# Patient Record
Sex: MALE | Race: WHITE | Employment: OTHER | URBAN - METROPOLITAN AREA
[De-identification: names, ages, dates, MRNs, and addresses within clinical notes are randomized per-mention and may not be internally consistent; named-entity substitution may affect disease eponyms.]

---

## 2017-09-21 ENCOUNTER — HOSPITAL ENCOUNTER (EMERGENCY)
Age: 72
Discharge: HOME | End: 2017-09-21
Attending: EMERGENCY MEDICINE | Admitting: EMERGENCY MEDICINE
Payer: MEDICARE

## 2017-09-21 VITALS
WEIGHT: 259 LBS | TEMPERATURE: 98.1 F | RESPIRATION RATE: 18 BRPM | DIASTOLIC BLOOD PRESSURE: 64 MMHG | HEART RATE: 56 BPM | OXYGEN SATURATION: 94 % | SYSTOLIC BLOOD PRESSURE: 117 MMHG | BODY MASS INDEX: 39.25 KG/M2 | HEIGHT: 68 IN

## 2017-09-21 DIAGNOSIS — T40.601A OPIATE OVERDOSE, ACCIDENTAL OR UNINTENTIONAL, INITIAL ENCOUNTER (HCC): Primary | ICD-10-CM

## 2017-09-21 PROCEDURE — 99282 EMERGENCY DEPT VISIT SF MDM: CPT

## 2017-09-21 RX ORDER — OXYCODONE HCL 20 MG/1
20 TABLET, FILM COATED, EXTENDED RELEASE ORAL EVERY 12 HOURS
COMMUNITY

## 2017-09-21 RX ORDER — CLOPIDOGREL BISULFATE 75 MG/1
75 TABLET ORAL
COMMUNITY

## 2017-09-21 RX ORDER — MEMANTINE HYDROCHLORIDE 10 MG/1
TABLET ORAL DAILY
COMMUNITY

## 2017-09-21 RX ORDER — SALSALATE 750 MG
750 TABLET ORAL 2 TIMES DAILY
COMMUNITY

## 2017-09-21 RX ORDER — ERGOCALCIFEROL 1.25 MG/1
50000 CAPSULE ORAL
COMMUNITY

## 2017-09-21 RX ORDER — PANTOPRAZOLE SODIUM 40 MG/1
40 TABLET, DELAYED RELEASE ORAL DAILY
COMMUNITY

## 2017-09-21 RX ORDER — AMLODIPINE BESYLATE AND BENAZEPRIL HYDROCHLORIDE 10; 40 MG/1; MG/1
1 CAPSULE ORAL DAILY
COMMUNITY

## 2017-09-21 RX ORDER — CYCLOBENZAPRINE HCL 10 MG
10 TABLET ORAL
COMMUNITY

## 2017-09-21 RX ORDER — FENOFIBRATE 160 MG/1
160 TABLET ORAL DAILY
COMMUNITY

## 2017-09-21 RX ORDER — ALFUZOSIN HYDROCHLORIDE 10 MG/1
10 TABLET, EXTENDED RELEASE ORAL DAILY
COMMUNITY

## 2017-09-21 RX ORDER — TRAZODONE HYDROCHLORIDE 50 MG/1
50 TABLET ORAL
COMMUNITY

## 2017-09-21 RX ORDER — OXYCODONE HYDROCHLORIDE 15 MG/1
15 TABLET ORAL
COMMUNITY

## 2017-09-21 RX ORDER — SERTRALINE HYDROCHLORIDE 100 MG/1
TABLET, FILM COATED ORAL DAILY
COMMUNITY

## 2017-09-21 RX ORDER — SIMVASTATIN 20 MG/1
20 TABLET, FILM COATED ORAL
COMMUNITY

## 2017-09-21 RX ORDER — DICLOFENAC SODIUM 10 MG/G
GEL TOPICAL 4 TIMES DAILY
COMMUNITY

## 2017-09-21 RX ORDER — ALBUTEROL SULFATE 90 UG/1
AEROSOL, METERED RESPIRATORY (INHALATION)
COMMUNITY

## 2017-09-21 NOTE — ED PROVIDER NOTES
Date: 9/21/2017   Patient Name: Jane Khalil   YOB: 1945  Medical Record Number: [id number]    HISTORY OF PRESENTING ILLNESS / COMPLAINT     Chief Complaint   Patient presents with    Drug Overdose        History Provided By:  patient and spouse    Barriers to Obtaining History:  NONE    Additional History: 11:17 AM  Jane Khalil is a 67 y.o. male with PMHx of dementia, HTN, hypercholesteremia, asthma, and an enlarged prostrate who presents to the emergency department C/O taking Oxycontin 20 mg x 2, Oxycodone 15 mg x 2 and Zoloft x 2 (100 mg tabs). Pt took first dose at 8:00 AM and second dose 30 minutes ago. No associated sxs include. Pt denies scratching/itching, nausea, vomiting, and any other symptoms or complaints at this time. Primary Care Provider: Estevan Sánchez MD   Specialist:    PAST HISTORY     Past Medical History:   Past Medical History:   Diagnosis Date    Asthma     Chronic back pain     Dementia     Depression     Enlarged prostate     Hypercholesteremia     Hypertension     Sleep disorder         Past Surgical History:   No past surgical history on file. Family History:   No family history on file. Social History:   Social History   Substance Use Topics    Smoking status: Not on file    Smokeless tobacco: Not on file    Alcohol use Not on file        Allergies:   No Known Allergies     Review of Systems   Review of Systems   Constitutional: Negative for chills and fever. Gastrointestinal: Negative for nausea and vomiting. All other systems reviewed and are negative.       Physical Exam  Vitals:    09/21/17 1022   BP: 117/64   Pulse: (!) 56   Resp: 18   Temp: 98.1 °F (36.7 °C)   SpO2: 94%   Weight: 117.5 kg (259 lb)   Height: 5' 8\" (1.727 m)       Physical Exam  -------------------------PHYSICAL EXAM-------------------------    Vital signs and nursing notes reviewed  Nursing note and VS were reviewed      CONSTITUTIONAL: Mental status: Awake and alert. No immediate acute distress. Non-toxic in appearance. Well developed, well nourished and appears adequately hydrated. HEAD: Normocephalic, Atraumatic. EYES: Pupils are 2 mm, equal, round with sluggish reactivity. Extra-ocular movements intact. Sclera are non icteric. Conjunctiva not injected. ENT: Mucous membranes are moist.   Oral mucosa: There is no erythema or swelling; No oral lesions or thrush. Tonsillar tissue: NO enlargement of the tonsillar tissue or the presence of exudates. Ears: R:  EAC - clear, no swelling with TM that is normal in appearance. L:  EAC - clear, no swelling with TM that is normal in appearance. Nasal mucosa pink with no discharge and the turbinates are of normal size. NECK: Normal ROM. Neck is supple. Anterior aspect: Anterior cervical adenopathy: nothing abnormal.  No obvious enlargement of the thyroid. Trachea is midline. Posterior aspect: Posterior cervical paraspinal muscles are non tender and  bony midline is non tender. Posterior adenopathy: none palpable. CARDIOVASCULAR:  HEART- The rhythm is regular and the rate sounds to be normal. No murmurs, rubs or gallops. VASCULAR - Distal pulses are 2+ and equal.    PULMONARY: Patient is speaking in full sentences. Respiratory effort is normal and without the use of accessory muscles. Air exchange is good. Breath sounds:  Clear to auscultation bilaterally. No wheezing, rales or rhonchi. CHEST WALL: Normal shape;  non tender to palpation; no crepitus    ABDOMINAL: Visual: non -distended; Ausculation: Bowel sounds are present. Palpation: Soft; No obvious organomegaly; Palpable tenderness: NONE. NO peritoneal signs - No rebound, guarding or rigidity. BACK: Midline - No bony tenderness; Paraspinal muscles are non tender. Full range of motion. No CVA tenderness. MUSCULOSKELETAL:   Lower Extremities: Peripheral edema- none noted;  In general there is No obvious soft tissue tenderness or sites of bony tenderness or deformities;   Joints: No evidence of acute inflammatory changes and have good range of motion in all extremities. Muscles: Good tone and No obvious muscle tenderness. Upper Extremities: Peripheral edema- none noted; In general there is No obvious soft tissue tenderness or sites of bony tenderness or deformities;   Joints: No evidence of acute inflammatory changes and have good range of motion in all extremities. Muscles: Good tone and No obvious muscle tenderness. SKIN:  Good skin turgor. Cap Refill is Normal. Skin is warm and dry and with NO diaphoresis. Rashes: NONE or nothing that appears infectious; NO petechiae. NO particular lesions. NEUROLOGICAL: Alert, awake and appropriately oriented. Normal speech. CN's are normal;  Motor - no focal weakness; no obvious sensory loss; Cerebellar function- intact; DTR's - 2+ equal    PSYCH: Appropriate affect; normal thought content; no expressed suicidal ideation. INITIAL CLINICAL IMPRESSION and PLANS :  The patient presents with the primary complaint(s) of ACUTE history of : opiate overdose. The presentation, to include historical aspects and clinical findings appear to be consistent with the DX of accidental ingestion of opiate medication. My initial focus is to Determine the cause and extent of the problem . Considering the above, my initial management plan to evaluate and therapeutic interventions include: NO interventions as none are indicated  As well as those noted in the orders:      Sumeet Zepeda 27:  I have reviewed past medical records with pertinent portions incorporated below. I reviewed the vital signs, available nursing notes, past medical history, past surgical history, family history and social history. I have spoken to other providers as described below. Old Medical Records: Nursing notes.          Pertinent Input from Medical Records:    Diagnostic Study Results:       Labs -    No results found for this or any previous visit (from the past 12 hour(s)). Radiologic Studies -  The following have been ordered and reviewed:  No orders to display       ED COURSE:    Vital Signs-Reviewed the patient's vital signs. Patient Vitals for the past 12 hrs:   Temp Pulse Resp BP SpO2   09/21/17 1022 98.1 °F (36.7 °C) (!) 56 18 117/64 94 %       Pulse Oximetry Analysis - Normal 94% on RA     Provider Notes:    11:17 AM  Initial assessment performed. I examined the patient and provided information regarding the scope of my initial clinical impression and plans for diagnostic and therapeutic intervention(s). I have encouraged them to ask questions as they arise throughout their visit. PROGRESS NOTE:   11:26 AM  [employer], who agrees with plan to dischare home with a reliable observer. Poison Control will call him at home to check on him in 2-3 hours      Maricarmen Canales MD      Procedures:   Procedures    Medications Given in the ED:  Medications - No data to display      CONCLUDING NOTES:   I was the first provider for this patient. During the ED course I had re-evaluated the patient, answered their and /or their family's questions regarding my clinical impression, the patient's condition and plans for therapeutic interventions. The patient's ED course was uneventful and remained stable throughout. Response to Intervention(s):   IMPROVED      Unanticipated Developments: NONE     CLINICAL IMPRESSION AND DISCUSSION:     Based on the clinical presentation, findings and results of diagnostic studies, as well as developments while in the ED,  I suspect the following: For the presentation noted above    My clinical impression is: accidental narcotic ingestion.         DISCUSSION REGARDING CLINICAL IMPRESSION: The specifics regarding my clinical impression / diagnosis are as follows:        Specific Conversations: NONE      DISPOSITION DECISION:   11:36 AM  DISCHARGE: I feel that we have optimized outpatient assessment and management such that Keyana Conley is stable to be discharged and to continue with her care or complete any additional evaluation as appropriate at home or as an outpatient. Preparations will be made to discharge the patient. Present condition at the time of disposition: STABLE    ANY SPECIFIC INFORMATION REGARDING THE DISPOSITION: NONE        DISCHARGE NOTE:    Ruiz Spear Harman's  results have been reviewed with him. He has been counseled regarding his diagnosis, treatment, and plan. He verbally conveys understanding and agreement of the signs, symptoms, diagnosis, treatment and prognosis and additionally agrees to follow up as discussed. He also agrees with the care-plan and conveys that all of his questions have been answered. I have also provided discharge instructions for him that include: educational information regarding their diagnosis and treatment, and list of reasons why they would want to return to the ED prior to their follow-up appointment, should his condition change. The patient and/or family has been provided with education for proper Emergency Department utilization. CLINICAL IMPRESSION  1. Opiate overdose, accidental or unintentional, initial encounter        PLAN:  1. D/C home  2. Patient's Medications   Start Taking    No medications on file   Continue Taking    ALBUTEROL (PROVENTIL HFA, VENTOLIN HFA, PROAIR HFA) 90 MCG/ACTUATION INHALER    Take  by inhalation. ALFUZOSIN SR (UROXATRAL) 10 MG SR TABLET    Take 10 mg by mouth daily. AMLODIPINE-BENAZEPRIL (LOTREL) 10-40 MG PER CAPSULE    Take 1 Cap by mouth daily. CLOPIDOGREL (PLAVIX) 75 MG TAB    Take 75 mg by mouth. CYCLOBENZAPRINE (FLEXERIL) 10 MG TABLET    Take 10 mg by mouth three (3) times daily as needed for Muscle Spasm(s). DICLOFENAC (VOLTAREN) 1 % GEL    Apply  to affected area four (4) times daily. ERGOCALCIFEROL (ERGOCALCIFEROL) 50,000 UNIT CAPSULE    Take 50,000 Units by mouth. FENOFIBRATE (LOFIBRA) 160 MG TABLET    Take 160 mg by mouth daily. MEMANTINE (NAMENDA) 10 MG TABLET    Take  by mouth daily. OXYCODONE ER (OXYCONTIN) 20 MG ER TABLET    Take 20 mg by mouth every twelve (12) hours. OXYCODONE IR (OXY-IR) 15 MG IMMEDIATE RELEASE TABLET    Take 15 mg by mouth every four (4) hours as needed for Pain. PANTOPRAZOLE (PROTONIX) 40 MG TABLET    Take 40 mg by mouth daily. SALSALATE (DISALCID) 750 MG TABLET    Take 750 mg by mouth two (2) times a day. SERTRALINE (ZOLOFT) 100 MG TABLET    Take  by mouth daily. SIMVASTATIN (ZOCOR) 20 MG TABLET    Take 20 mg by mouth nightly. TRAZODONE (DESYREL) 50 MG TABLET    Take 50 mg by mouth nightly. These Medications have changed    No medications on file   Stop Taking    No medications on file     3. Follow-up Information     Follow up With Details Comments Contact Info    Cris Miller MD  If symptoms worsen [address] [facility]  [phone]      [facility] EMERGENCY DEPT Go to As needed, If symptoms worsen [address] [place] [id number]  [phone]        Return if sxs worsen    ATTESTATIONS:  This note is prepared by Ann Foote, acting as Scribe for Светлана Lizarraga MD.    Светлана Lizarraga MD: The scribe's documentation has been prepared under my direction and personally reviewed by me in its entirety. I confirm that the note above accurately reflects all work, treatment, procedures, and medical decision making performed by me.

## 2017-09-21 NOTE — Clinical Note
SPECIAL DISCHARGE INSTRUCTIONS AND COMMENTS: 
 
General comments: Thank you for allowing us to provide you with excellent care today. We hope we addressed all of your concerns and needs. We strive to provide excellent quality care in the Emergency Depart ment. If you feel that you have not received excellent quality care or timely care, please ask to speak to the nurse manager. Please choose us in the future for your continued health care needs. Follow-up comments: The exam and treatment you rece ived in the Emergency Department were for an urgent problem that may be new for you and / or one which may be related to a worsening of a chronic or ongoing medical problem that you already had prior to this visit. In any case, today's treatment is not i ntended to be considered as complete care in all cases and thus, it is important that you follow-up with a doctor, nurse practitioner, or physicians assistant to: (1) confirm your diagnosis, (2) re-evaluation of changes in your illness and treatment, an d (3) for ongoing care. In some cases we may have contacted your doctor or a specific specialist who may be involved in your future evaluation and care but in any case, take this sheet with you when you go to your follow-up visit or refer to the infor jaison on these sheets when you are calling to arrange an appointment - it may prove helpful in making the appointment. Prescribed Medications: Unless you have been directed by the provider to change your current medicines, you should continue to samuel e them as before. If you have been prescribed medicines, please take them as directed. In some cases, these new medicines are intended to replace a medicine that you are currently taking and if so, this will be noted below.  
 
 Our expectation is that y our condition will improve by following the doctor's recommendations, however, if in the event your condition worsens or does not improve as expected, please follow-up with your PCP or if unable to reach your usual health care provider, you should return  to the Emergency Department. We are available 24 hours a day. SPECIFIC INSTRUCTIONS PROVIDED BY YOUR DOCTOR, German Walter MD:  
 
CLOSE OBSERVATION.  
CALL ME [phone] AT 2:00 TODAY

## 2017-09-21 NOTE — ED NOTES
Discharged per ambulatory, no acute distress on discharge, written inst given to pt, verbalizes understanding  Patient armband removed and shredded

## 2017-09-21 NOTE — DISCHARGE INSTRUCTIONS
Accidental Overdose of Medicine: Care Instructions  Your Care Instructions  Almost any medicine can cause harm if you take too much of it. You have had treatment to help your body get rid of an overdose of a medicine. This may have been an over-the-counter medicine. Or it might be one that a doctor prescribed. It may even have been a vitamin or a supplement. During treatment, the doctor may have given you fluids and medicine. You also may have had lab tests. Then the doctor made sure that you were well enough to go home. The doctor has checked you carefully, but problems can develop later. If you notice any problems or new symptoms, get medical treatment right away. Follow-up care is a key part of your treatment and safety. Be sure to make and go to all appointments, and call your doctor if you are having problems. It's also a good idea to know your test results and keep a list of the medicines you take. How can you care for yourself at home? Home care  · Drink plenty of fluids. If you have kidney, heart, or liver disease and have to limit fluids, talk with your doctor before you increase the amount of fluids you drink. · If you normally take medicines, ask your doctor when you can start taking them again. · Read the information that comes with any medicine. If you have questions, ask your doctor or pharmacist.  Prevention  · Be safe with medicines. Take your medicines exactly as prescribed or as the label directs. Call your doctor if you think you are having a problem with your medicine. · Keep your medicines in the containers they came in. Keep them with the original labels. · Find out what to do if you miss a dose of your medicine. When should you call for help? Call 911 anytime you think you may need emergency care. For example, call if:  · You passed out (lost consciousness). · You have trouble breathing. · You are sleepy or hard to wake up.   Call your doctor now or seek immediate medical care if:  · You are vomiting. · You have a new or worse headache. · You are dizzy or lightheaded or feel like you may faint. Watch closely for changes in your health, and be sure to contact your doctor if:  · You do not get better as expected. Where can you learn more? Go to http://angel-sia.info/. Enter C165 in the search box to learn more about \"Accidental Overdose of Medicine: Care Instructions. \"  Current as of: March 24, 2017  Content Version: 11.3  © 9327-3707 [employer]. Care instructions adapted under license by [employer] (which disclaims liability or warranty for this information). If you have questions about a medical condition or this instruction, always ask your healthcare professional. [address] any warranty or liability for your use of this information.

## 2017-09-21 NOTE — ED TRIAGE NOTES
C/o taking oxycontin 20 mg x2,oxycodone 15 mg x2 and zoloft x2 (100 mg tabs), wife states pt has early onset dementia and took todays and tomorrow's pills from his pill dispenser. States wife noted that his dispenser was empty for today and tomorrow. Pt states he took today's meds around 0800 and then took the others around 0830. Also took extra dose plavix. Sepsis Screening completed    (  )Patient meets SIRS criteria. ( x )Patient does not meet SIRS criteria.       SIRS Criteria is achieved when two or more of the following are present   Temperature < 96.8°F (36°C) or > 100.9°F (38.3°C)   Heart Rate > 90 beats per minute   Respiratory Rate > 20 breaths per minute   WBC count > 12,000 or <4,000 or > 10% bands